# Patient Record
Sex: FEMALE | Race: WHITE | NOT HISPANIC OR LATINO | Employment: FULL TIME | URBAN - METROPOLITAN AREA
[De-identification: names, ages, dates, MRNs, and addresses within clinical notes are randomized per-mention and may not be internally consistent; named-entity substitution may affect disease eponyms.]

---

## 2022-01-07 ENCOUNTER — OFFICE VISIT (OUTPATIENT)
Dept: FAMILY MEDICINE CLINIC | Facility: CLINIC | Age: 62
End: 2022-01-07
Payer: COMMERCIAL

## 2022-01-07 VITALS
BODY MASS INDEX: 18.11 KG/M2 | HEART RATE: 77 BPM | RESPIRATION RATE: 18 BRPM | SYSTOLIC BLOOD PRESSURE: 140 MMHG | OXYGEN SATURATION: 97 % | WEIGHT: 102.2 LBS | HEIGHT: 63 IN | DIASTOLIC BLOOD PRESSURE: 80 MMHG | TEMPERATURE: 97.7 F

## 2022-01-07 DIAGNOSIS — Z13.220 SCREENING FOR CHOLESTEROL LEVEL: ICD-10-CM

## 2022-01-07 DIAGNOSIS — E46 PROTEIN-CALORIE MALNUTRITION, UNSPECIFIED SEVERITY (HCC): ICD-10-CM

## 2022-01-07 DIAGNOSIS — Z13.0 SCREENING FOR IRON DEFICIENCY ANEMIA: ICD-10-CM

## 2022-01-07 DIAGNOSIS — Z13.29 SCREENING FOR THYROID DISORDER: ICD-10-CM

## 2022-01-07 DIAGNOSIS — Z72.0 CURRENT TOBACCO USE: ICD-10-CM

## 2022-01-07 DIAGNOSIS — J43.8 OTHER EMPHYSEMA (HCC): ICD-10-CM

## 2022-01-07 DIAGNOSIS — Z12.31 ENCOUNTER FOR SCREENING MAMMOGRAM FOR MALIGNANT NEOPLASM OF BREAST: Primary | ICD-10-CM

## 2022-01-07 DIAGNOSIS — Z13.1 SCREENING FOR DIABETES MELLITUS: ICD-10-CM

## 2022-01-07 PROBLEM — M79.10 MYALGIA DUE TO STATIN: Status: ACTIVE | Noted: 2022-01-07

## 2022-01-07 PROBLEM — T46.6X5A MYALGIA DUE TO STATIN: Status: ACTIVE | Noted: 2022-01-07

## 2022-01-07 PROCEDURE — 3725F SCREEN DEPRESSION PERFORMED: CPT | Performed by: FAMILY MEDICINE

## 2022-01-07 PROCEDURE — 3008F BODY MASS INDEX DOCD: CPT | Performed by: FAMILY MEDICINE

## 2022-01-07 PROCEDURE — 99204 OFFICE O/P NEW MOD 45 MIN: CPT | Performed by: FAMILY MEDICINE

## 2022-01-07 RX ORDER — BUDESONIDE AND FORMOTEROL FUMARATE DIHYDRATE 80; 4.5 UG/1; UG/1
2 AEROSOL RESPIRATORY (INHALATION) 2 TIMES DAILY PRN
Qty: 10.2 G | Refills: 5 | Status: SHIPPED | OUTPATIENT
Start: 2022-01-07

## 2022-01-07 RX ORDER — ALBUTEROL SULFATE 90 UG/1
2 AEROSOL, METERED RESPIRATORY (INHALATION) EVERY 6 HOURS PRN
Qty: 18 G | Refills: 5 | Status: SHIPPED | OUTPATIENT
Start: 2022-01-07

## 2022-01-07 RX ORDER — BUDESONIDE AND FORMOTEROL FUMARATE DIHYDRATE 80; 4.5 UG/1; UG/1
2 AEROSOL RESPIRATORY (INHALATION) 2 TIMES DAILY PRN
COMMUNITY
End: 2022-01-07 | Stop reason: SDUPTHER

## 2022-01-07 NOTE — PROGRESS NOTES
Assessment/Plan:   1  Other emphysema (HCC)/Current tobacco use  Patient appears clinically stable today  At this time, she was advised on importance of quitting her tobacco use  Reviewed the medical treatment options for her COPD  She was advised that her Symbicort as a daily medication and she would be best managed if she uses this every day  She was given a Ventolin here to use p r n  for symptom relief  At this time, will send patient for CT lung screening   - budesonide-formoterol (SYMBICORT) 80-4 5 MCG/ACT inhaler; Inhale 2 puffs 2 (two) times a day as needed (wheezing) Rinse mouth after use  Dispense: 10 2 g; Refill: 5  - albuterol (Ventolin HFA) 90 mcg/act inhaler; Inhale 2 puffs every 6 (six) hours as needed for wheezing  Dispense: 18 g; Refill: 5  - CT lung screening program; Future    2  Encounter for screening mammogram for malignant neoplasm of breast  - Mammo screening bilateral w 3d & cad; Future               Diagnoses and all orders for this visit:    Encounter for screening mammogram for malignant neoplasm of breast    Other orders  -     budesonide-formoterol (SYMBICORT) 80-4 5 MCG/ACT inhaler; Inhale 2 puffs 2 (two) times a day as needed (wheezing) Rinse mouth after use  Subjective:       Chief Complaint   Patient presents with   Marifer Esquivel Establish Care    Hand Problem     L hand       Patient ID: Sivakumar Zayas is a 63 y o  female presents today as a new patient to establish care  She has COPD, chronic tobacco use, elevated cholesterol  She has been taking her medications intermittently  She denies any recent exacerbations of her COPD  She has not been to a doctor in a few years  She states that she has not had an evaluation for blood work in over 5 years  She is here to establish care     HPI    Review of Systems   Constitutional: Negative for activity change, chills, fatigue and fever  HENT: Negative for congestion, ear pain, sinus pressure and sore throat      Eyes: Negative for redness, itching and visual disturbance  Respiratory: Negative for cough and shortness of breath  Cardiovascular: Negative for chest pain and palpitations  Gastrointestinal: Negative for abdominal pain, diarrhea and nausea  Endocrine: Negative for cold intolerance and heat intolerance  Genitourinary: Negative for dysuria, flank pain and frequency  Musculoskeletal: Negative for arthralgias, back pain, gait problem and myalgias  Skin: Negative for color change  Allergic/Immunologic: Negative for environmental allergies  Neurological: Negative for dizziness, numbness and headaches  Psychiatric/Behavioral: Negative for behavioral problems and sleep disturbance  The following portions of the patient's history were reviewed and updated as appropriate : past family history, past medical history, past social history and past surgical history  Current Outpatient Medications:     budesonide-formoterol (SYMBICORT) 80-4 5 MCG/ACT inhaler, Inhale 2 puffs 2 (two) times a day as needed (wheezing) Rinse mouth after use , Disp: , Rfl:          Objective:         Vitals:    01/07/22 1556   BP: 140/80   BP Location: Right arm   Patient Position: Sitting   Cuff Size: Adult   Pulse: 77   Resp: 18   Temp: 97 7 °F (36 5 °C)   TempSrc: Tympanic   SpO2: 97%   Weight: 46 4 kg (102 lb 3 2 oz)   Height: 5' 2 5" (1 588 m)     Physical Exam  Vitals reviewed  Constitutional:       Appearance: She is well-developed  HENT:      Head: Normocephalic and atraumatic  Nose: Nose normal       Mouth/Throat:      Pharynx: No oropharyngeal exudate  Eyes:      General: No scleral icterus  Right eye: No discharge  Left eye: No discharge  Pupils: Pupils are equal, round, and reactive to light  Neck:      Trachea: No tracheal deviation  Cardiovascular:      Rate and Rhythm: Normal rate and regular rhythm        Pulses:           Dorsalis pedis pulses are 2+ on the right side and 2+ on the left side         Posterior tibial pulses are 2+ on the right side and 2+ on the left side  Heart sounds: Normal heart sounds  No murmur heard  No friction rub  No gallop  Pulmonary:      Effort: Pulmonary effort is normal  No respiratory distress  Breath sounds: Normal breath sounds  No wheezing or rales  Abdominal:      General: Bowel sounds are normal  There is no distension  Palpations: Abdomen is soft  Tenderness: There is no abdominal tenderness  There is no guarding or rebound  Musculoskeletal:         General: Normal range of motion  Cervical back: Normal range of motion and neck supple  Lymphadenopathy:      Head:      Right side of head: No submental or submandibular adenopathy  Left side of head: No submental or submandibular adenopathy  Cervical: No cervical adenopathy  Right cervical: No superficial, deep or posterior cervical adenopathy  Left cervical: No superficial, deep or posterior cervical adenopathy  Skin:     General: Skin is warm and dry  Findings: No erythema  Neurological:      Mental Status: She is alert and oriented to person, place, and time  Cranial Nerves: No cranial nerve deficit  Sensory: No sensory deficit  Psychiatric:         Mood and Affect: Mood is not anxious or depressed  Speech: Speech normal          Behavior: Behavior normal          Thought Content:  Thought content normal          Judgment: Judgment normal

## 2022-09-28 ENCOUNTER — APPOINTMENT (OUTPATIENT)
Dept: RADIOLOGY | Facility: CLINIC | Age: 62
End: 2022-09-28
Payer: COMMERCIAL

## 2022-09-28 ENCOUNTER — OFFICE VISIT (OUTPATIENT)
Dept: URGENT CARE | Facility: CLINIC | Age: 62
End: 2022-09-28
Payer: COMMERCIAL

## 2022-09-28 VITALS
SYSTOLIC BLOOD PRESSURE: 128 MMHG | TEMPERATURE: 94 F | OXYGEN SATURATION: 94 % | DIASTOLIC BLOOD PRESSURE: 64 MMHG | RESPIRATION RATE: 18 BRPM | HEART RATE: 78 BPM

## 2022-09-28 DIAGNOSIS — S99.921A RIGHT FOOT INJURY, INITIAL ENCOUNTER: ICD-10-CM

## 2022-09-28 DIAGNOSIS — S92.001A CLOSED NONDISPLACED FRACTURE OF RIGHT CALCANEUS, UNSPECIFIED PORTION OF CALCANEUS, INITIAL ENCOUNTER: ICD-10-CM

## 2022-09-28 DIAGNOSIS — S92.254A CLOSED NONDISPLACED FRACTURE OF NAVICULAR BONE OF RIGHT FOOT, INITIAL ENCOUNTER: Primary | ICD-10-CM

## 2022-09-28 PROCEDURE — 99213 OFFICE O/P EST LOW 20 MIN: CPT

## 2022-09-28 PROCEDURE — 73630 X-RAY EXAM OF FOOT: CPT

## 2022-09-28 NOTE — PROGRESS NOTES
[address] [facility]        NAME: Julissa Hickey is a 63 y o  female  : 1960    MRN: [id number]  DATE: 2022  TIME: 1:07 PM    Assessment and Plan   Right foot injury, initial encounter [S99 921A]  1  Right foot injury, initial encounter  XR foot 3+ vw right         Patient Instructions     XR prelim reading: lateral calcaneal fracture with proximal navicular fracture  Referral to Ortho  Boot placed  NSAID & ice as directed  Follow up with PCP in 2-3 days  Proceed to ER if symptoms worsen  Chief Complaint     Chief Complaint   Patient presents with   [facility] Injury     Patient tripped over dog and hurt right foot         History of Present Illness     63 y o  F presents s/p fall at 4 am today  States she tripped over her dog and fell down 2 steps, injuring R foot  She is unsure how she landed  Iced foot  No OTC meds  Denies prior trauma or surgery  Denies numbness or tingling  Able to bear weight, drove to this appointment  Review of Systems   Review of Systems   Constitutional: Negative for chills, fatigue and fever  HENT: Negative for congestion, ear pain, facial swelling, hearing loss, rhinorrhea, sinus pressure, sneezing, sore throat and trouble swallowing  Eyes: Negative for pain, redness and visual disturbance  Respiratory: Negative for cough, chest tightness, shortness of breath and wheezing  Cardiovascular: Negative for chest pain and palpitations  Gastrointestinal: Negative for abdominal pain, diarrhea, nausea and vomiting  Genitourinary: Negative for dysuria, flank pain, hematuria and pelvic pain  Musculoskeletal: Positive for arthralgias and joint swelling  Negative for back pain and myalgias  Skin: Negative for color change and rash  Neurological: Negative for dizziness, seizures, syncope, weakness, light-headedness and headaches  Psychiatric/Behavioral: Negative for confusion, hallucinations and sleep disturbance  The patient is not nervous/anxious  All other systems reviewed and are negative  Current Medications       Current Outpatient Medications:     albuterol (Ventolin HFA) 90 mcg/act inhaler, Inhale 2 puffs every 6 (six) hours as needed for wheezing, Disp: 18 g, Rfl: 5    budesonide-formoterol (SYMBICORT) 80-4 5 MCG/ACT inhaler, Inhale 2 puffs 2 (two) times a day as needed (wheezing) Rinse mouth after use , Disp: 10 2 g, Rfl: 5    Current Allergies     Allergies as of 2022 - Reviewed 2022   Allergen Reaction Noted    Sulfa antibiotics Anaphylaxis and Swelling 2022    Penicillins Hives 2022            The following portions of the patient's history were reviewed and updated as appropriate: allergies, current medications, past family history, past medical history, past social history, past surgical history and problem list      No past medical history on file  Past Surgical History:   Procedure Laterality Date     SECTION      2    HAND SURGERY      TRIGGER FINGER RELEASE         Family History   Problem Relation Age of Onset    Heart attack Father          Medications have been verified  Objective   /64   Pulse 78   Temp (!) 94 °F (34 4 °C) (Tympanic)   Resp 18   SpO2 94%   No LMP recorded  Patient is postmenopausal        Physical Exam     Physical Exam  Vitals reviewed  Constitutional:       General: She is not in acute distress  Appearance: Normal appearance  She is not toxic-appearing  HENT:      Head: Normocephalic  Right Ear: Tympanic membrane normal  Tympanic membrane is not erythematous or bulging  Left Ear: Tympanic membrane normal  Tympanic membrane is not erythematous or bulging  Nose: No congestion or rhinorrhea  Right Sinus: No maxillary sinus tenderness or frontal sinus tenderness  Left Sinus: No maxillary sinus tenderness or frontal sinus tenderness  Mouth/Throat:      Pharynx: Uvula midline   No oropharyngeal exudate, posterior oropharyngeal erythema or uvula swelling  Tonsils: No tonsillar exudate or tonsillar abscesses  Eyes:      Extraocular Movements: Extraocular movements intact  Conjunctiva/sclera: Conjunctivae normal       Pupils: Pupils are equal, round, and reactive to light  Cardiovascular:      Rate and Rhythm: Normal rate and regular rhythm  Pulses: Normal pulses  Heart sounds: Normal heart sounds  Pulmonary:      Effort: Pulmonary effort is normal  No tachypnea or respiratory distress  Breath sounds: Normal breath sounds and air entry  No decreased breath sounds, wheezing, rhonchi or rales  Abdominal:      General: Bowel sounds are normal       Palpations: Abdomen is soft  Tenderness: There is no abdominal tenderness  There is no right CVA tenderness or guarding  Musculoskeletal:         General: Normal range of motion  Cervical back: Normal range of motion  Left foot: Normal capillary refill  Swelling and bony tenderness present  Normal pulse  Feet:    Lymphadenopathy:      Cervical: No cervical adenopathy  Skin:     General: Skin is warm and dry  Neurological:      General: No focal deficit present  Mental Status: She is alert  Cranial Nerves: Cranial nerves are intact  Sensory: Sensation is intact  Motor: Motor function is intact  Coordination: Coordination is intact  Gait: Gait is intact  Deep Tendon Reflexes: Reflexes are normal and symmetric

## 2022-09-28 NOTE — PATIENT INSTRUCTIONS
Your X-ray revealed fractures in the navicular and calcaneal bones  Referral to Ortho was placed  Boot provided  Non-weight bearing until evaluated by Orthopedics  NSAID & ice  Follow up with PCP in 3-5 days  Proceed to ER if symptoms worsen

## 2022-09-28 NOTE — LETTER
September 28, 2022     Patient: Cheryle Southerly   YOB: 1960   Date of Visit: 9/28/2022       To Whom it May Concern:    Cheryle Southerly was seen in my clinic on 9/28/2022  Please excuse her from work until evaluated by [employer]  Thank you           Sincerely,              Raphael Meng

## 2022-10-05 ENCOUNTER — OFFICE VISIT (OUTPATIENT)
Dept: PODIATRY | Facility: CLINIC | Age: 62
End: 2022-10-05
Payer: COMMERCIAL

## 2022-10-05 VITALS — HEIGHT: 63 IN | SYSTOLIC BLOOD PRESSURE: 126 MMHG | DIASTOLIC BLOOD PRESSURE: 80 MMHG | BODY MASS INDEX: 18.39 KG/M2

## 2022-10-05 DIAGNOSIS — S92.001A CLOSED NONDISPLACED FRACTURE OF RIGHT CALCANEUS, UNSPECIFIED PORTION OF CALCANEUS, INITIAL ENCOUNTER: ICD-10-CM

## 2022-10-05 DIAGNOSIS — S92.254A CLOSED NONDISPLACED FRACTURE OF NAVICULAR BONE OF RIGHT FOOT, INITIAL ENCOUNTER: Primary | ICD-10-CM

## 2022-10-05 PROCEDURE — 99242 OFF/OP CONSLTJ NEW/EST SF 20: CPT | Performed by: PODIATRIST

## 2022-10-05 NOTE — PROGRESS NOTES
Assessment/Plan:  Explained to patient that she is dealing with 2 fractures of the right foot  A right navicular flake fracture and a nondisplaced fracture of the calcaneus  Patient told to continue in cam walker and utilizing crutches for assistance  If pain allows, she may stop utilizing the crutches  Explained to patient that it typically takes 6 weeks to heal such an injury  She will be seen in 4 weeks and will have x-rays prior to the visit  Reappoint 4 weeks  Patient given note to be out of work until November 9th  No problem-specific Assessment & Plan notes found for this encounter  Diagnoses and all orders for this visit:    Closed nondisplaced fracture of navicular bone of right foot, initial encounter  -     Ambulatory Referral to Orthopedic Surgery  -     X-ray foot right 3+ views; Future    Closed nondisplaced fracture of right calcaneus, unspecified portion of calcaneus, initial encounter  -     Ambulatory Referral to Orthopedic Surgery  -     X-ray foot right 3+ views; Future          Subjective:      Patient ID: Pat Vidales is a 63 y o  female  HPI     Patient presents wearing a cam walker right foot and utilizing crutches to ambulate  Patient fell down stairs after tripping over a dog on September 28, 2022  Subsequent x-rays were positive for a flake fracture of the right navicular and a nondisplaced fracture of the right calcaneus  Patient states that she has moderate pain  She does not need pain medication  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  The following portions of the patient's history were reviewed and updated as appropriate: allergies, current medications, past family history, past medical history, past social history, past surgical history and problem list     Review of Systems   Gastrointestinal: Negative  Musculoskeletal: Positive for gait problem  Psychiatric/Behavioral: Negative  Objective:      /80   Ht 5' 2 5" (1 588 m)   BMI 18 39 kg/m²          Physical Exam  Constitutional:       Appearance: Normal appearance  Cardiovascular:      Pulses: Normal pulses  Musculoskeletal:         General: Tenderness and signs of injury present  Comments: Pain with palpation medial aspect right foot at navicular bone  Also lateral aspect right heel had calcaneus  Bruising noted at calcaneal fracture  Neurological:      General: No focal deficit present  Mental Status: She is oriented to person, place, and time  Dupixent Pregnancy And Lactation Text: This medication likely crosses the placenta but the risk for the fetus is uncertain. This medication is excreted in breast milk.

## 2022-10-05 NOTE — LETTER
October 5, 2022     Patient: Cristina Sutton  YOB: 1960  Date of Visit: 10/5/2022      To Whom it May Concern:    Cristina Sutton is under my professional care  Cristobalmanuel Hodges was seen in my office on 10/5/2022  Sandy Hodges may return to work on 11/9/22 based on right foot fracture healing  If you have any questions or concerns, please don't hesitate to call           Sincerely,          Annie Casanova DPM        CC: Cristina Kerre

## 2022-11-01 ENCOUNTER — APPOINTMENT (OUTPATIENT)
Dept: RADIOLOGY | Facility: CLINIC | Age: 62
End: 2022-11-01

## 2022-11-01 DIAGNOSIS — S92.001A CLOSED NONDISPLACED FRACTURE OF RIGHT CALCANEUS, UNSPECIFIED PORTION OF CALCANEUS, INITIAL ENCOUNTER: ICD-10-CM

## 2022-11-01 DIAGNOSIS — S92.254A CLOSED NONDISPLACED FRACTURE OF NAVICULAR BONE OF RIGHT FOOT, INITIAL ENCOUNTER: ICD-10-CM

## 2022-11-02 ENCOUNTER — OFFICE VISIT (OUTPATIENT)
Dept: PODIATRY | Facility: CLINIC | Age: 62
End: 2022-11-02

## 2022-11-02 VITALS — BODY MASS INDEX: 18.39 KG/M2 | HEIGHT: 63 IN | DIASTOLIC BLOOD PRESSURE: 74 MMHG | SYSTOLIC BLOOD PRESSURE: 148 MMHG

## 2022-11-02 DIAGNOSIS — S92.254A CLOSED NONDISPLACED FRACTURE OF NAVICULAR BONE OF RIGHT FOOT, INITIAL ENCOUNTER: Primary | ICD-10-CM

## 2022-11-02 NOTE — LETTER
November 2, 2022     Patient: Chandni Ford  YOB: 1960  Date of Visit: 11/2/2022      To Whom it May Concern:    Chandni Ford is under my professional care  Dm Snider was seen in my office on 11/2/2022  Malgorzatadmitri Snider may return to work on 12/5/22  If you have any questions or concerns, please don't hesitate to call           Sincerely,          Melina Greenberg DPM        CC: No Recipients

## 2022-11-02 NOTE — PROGRESS NOTES
Patient presents for assessment of right foot  Patient was seen approximately 3-4 weeks ago with a flake fracture of the right navicular and a nondisplaced fracture of the distal lateral calcaneus  At that time she is in a cam walker utilizing crutches and had significant pain  There has been significant improvement in the right foot  Patient still has pain but can ambulate in the cam walker without need of crutches and notes that the pain is currently mild  On exam, pain with palpation anterolateral aspect right calcaneus and at the right navicular  No bruising or edema in noted  I personally reviewed x-rays taken on 11/01/2022  He reveal resolving right navicular and calcaneal fractures  Patient notes that her job demands involves significant walking on concrete floors  She also must wear a steel-toed work boot  In light of this, it is anticipated that patient may return to work on 12/05/2022 and appropriate note was provided  She will be rescheduled in 3 weeks  She was told to try to return to a comfortable shoe a few days before that appointment

## 2022-11-23 ENCOUNTER — OFFICE VISIT (OUTPATIENT)
Dept: PODIATRY | Facility: CLINIC | Age: 62
End: 2022-11-23

## 2022-11-23 VITALS
WEIGHT: 105.6 LBS | BODY MASS INDEX: 18.71 KG/M2 | DIASTOLIC BLOOD PRESSURE: 68 MMHG | HEIGHT: 63 IN | SYSTOLIC BLOOD PRESSURE: 132 MMHG

## 2022-11-23 DIAGNOSIS — S92.254A CLOSED NONDISPLACED FRACTURE OF NAVICULAR BONE OF RIGHT FOOT, INITIAL ENCOUNTER: Primary | ICD-10-CM

## 2022-11-23 RX ORDER — MELOXICAM 15 MG/1
15 TABLET ORAL DAILY
Qty: 30 TABLET | Refills: 0 | Status: SHIPPED | OUTPATIENT
Start: 2022-11-23 | End: 2022-12-23

## 2022-11-23 NOTE — PROGRESS NOTES
Patient presents for assessment of right foot  She has returned to a comfortable shoe and relates intermittent pain dependent on amount that she is on it  Heel pain noted and not navicular pain  Patient is scheduled to return to work on 12/5  On exam pain with palpation calc/cuboid joint right foot  No edema or ecchymosis  She will try to return to a work boot prior to the 5th and will contact us if unable to comfortably wear it  Patient placed on meloxicam 15 mg daily for 30 days  RA 6 weeks

## 2022-12-27 DIAGNOSIS — J43.8 OTHER EMPHYSEMA (HCC): ICD-10-CM

## 2022-12-27 NOTE — TELEPHONE ENCOUNTER
Pt LVM on Friday stating that she was released from hospital and that [facility] would not refill her inhalers

## 2022-12-28 RX ORDER — BUDESONIDE AND FORMOTEROL FUMARATE DIHYDRATE 80; 4.5 UG/1; UG/1
2 AEROSOL RESPIRATORY (INHALATION) 2 TIMES DAILY PRN
Qty: 10.2 G | Refills: 0 | Status: SHIPPED | OUTPATIENT
Start: 2022-12-28

## 2022-12-28 RX ORDER — ALBUTEROL SULFATE 90 UG/1
2 AEROSOL, METERED RESPIRATORY (INHALATION) EVERY 6 HOURS PRN
Qty: 18 G | Refills: 0 | Status: SHIPPED | OUTPATIENT
Start: 2022-12-28

## 2023-11-21 ENCOUNTER — TELEPHONE (OUTPATIENT)
Dept: FAMILY MEDICINE CLINIC | Facility: CLINIC | Age: 63
End: 2023-11-21